# Patient Record
(demographics unavailable — no encounter records)

---

## 2024-12-31 NOTE — ASSESSMENT
[FreeTextEntry1] : 34 year old with low T to 174 and history infertility - though wife recently conceived.  Discussed natural history of infertility. Most couples achieve a pregnancy in the first 3 to 6 months of attempted conception, with 75% of couples achieving a pregnancy after 6 months of trying. After one year of attempting to conceive, approximately 85% of couples will have achieved a pregnancy. After two full years of attempting to conceive, this statistic is increased to over 90% of couples. In women under 35 years of age, infertility is considered present after 12 months of attempting to conceive. This duration is shortened in women over the age of 35 years to 6 months. Infertility may be due to male factors, female factors or a combination of male and female factors. a workup of both partners is always required. Discussed that maternal age is the strongest predictor of fertility outcome in couples undergoing therapy.  We discussed role of semen analysis - given wife recently pregnant he would like to hold at this time.  Discussed low T. Discussed AUA guidelines and 2 AM checks <300. Reports was also told low T in past but does not know value. Will recheck today along with LH, FSH, estradiol, prolactin. Discussed T supplements and impact on fertility and potential role of Clomid. Will refer patient to see Dr. Lara - sent message for his team to reach out.  Plan: - T, FSH, LH, estradiol, prolactin - will hold on  at this time given wife pregnant - f/u with Dr. Lara to discuss results and further management

## 2024-12-31 NOTE — PHYSICAL EXAM
[Urethral Meatus] : meatus normal [Penis Abnormality] : normal circumcised penis [de-identified] : testicles difficult to palpate given habitus [Chaperone Present] : A chaperone was present in the examining room during all aspects of the physical examination [FreeTextEntry2] : Martha

## 2024-12-31 NOTE — LETTER BODY
[Dear  ___] : Dear  [unfilled], [Courtesy Letter:] : I had the pleasure of seeing your patient, [unfilled], in my office today. [Please see my note below.] : Please see my note below. [Consult Closing:] : Thank you very much for allowing me to participate in the care of this patient.  If you have any questions, please do not hesitate to contact me. [Sincerely,] : Sincerely, [FreeTextEntry3] : Rona Vasquez MD Director of Robotic Education [facility]   [email or web address] [phone]

## 2025-03-03 NOTE — HISTORY OF PRESENT ILLNESS
[FreeTextEntry1] : REHAN PALOMARES  is 34 year referred by Dr Rona Vasquez Job:   Partner Name: Galina Waldron Partner Age: 35 Prior marriage: none  Prior Pregnancy miscarriage at 2.5 months   Duration of Relationship: 6 years Years unprotected sexual intercourse: 1 year Time [place]: x 8 months  Sexual dysfunction: none  Artificial lubricants:none Exposure history: no tobacco; no ETOH

## 2025-03-03 NOTE — LETTER BODY
[Please see my note below.] : Please see my note below. [FreeTextEntry2] : Devicka Persaud, MD [FreeTextEntry1] :   Dear Doctor,     I had the opportunity to see your patient, Mr. REHAN PALOMARES today. I am enclosing my office note for your information.   I will keep you informed of any developments.   Feel free to contact me if you have any questions.   Sincerely,   Junior Lara MD, FACS Professor of Urology [facility] [place] [address]   Office Telephone [phone]   Fax [phone] [DrDusty  ___] : Dr. COLBY

## 2025-03-03 NOTE — ASSESSMENT
[FreeTextEntry1] : 34 year old  who presents wtih inferility He and wife recently had miscarriage at 2 months Patient has had  2 Testosterone levels which were low ( 174) Had been put on T at 21 years of age ? indications  ? related to sparse hair loss  Examination demonstrates large testicles although high in scrotum  We reviewed the findings on physical examination and the endocrine studies.  He has no signs of hypogonadism.  We discussed the indications for treatment at low testosterone.  We discussed proceeding with a bone density. We discussed  assessing T and free T levels  We discussed proceeding with testicular ultrasound in light of difficulty assessing testes for masses etc  Plan: semen analysis  bone density T/free Testosterone SHBG Bioavailable Testosterone +/- clomid pending above The REHAN MARIELLE  expressed fully understanding of the information provided, the consequences and the management.

## 2025-03-03 NOTE — PHYSICAL EXAM
[General Appearance - Well Developed] : well developed [Abdomen Soft] : soft [Abdomen Tenderness] : non-tender [] : no hepato-splenomegaly [Abdomen Mass (___ Cm)] : no abdominal mass palpated [Abdomen Hernia] : no hernia was discovered [Urethral Meatus] : meatus normal [Penis Abnormality] : normal circumcised penis [FreeTextEntry1] : normal hair pattern; nomal male escutcheon [de-identified] : high within scrotum; testicles large; no obvious mass

## 2025-04-07 NOTE — ASSESSMENT
[FreeTextEntry1] : 34 year old  who presents wtih inferility He and wife recently had miscarriage at 2 months Patient has had  2 Testosterone levels which were low ( 174) Had been put on T at 21 years of age ? indications  ? related to sparse hair loss  Examination demonstrates large testicles although high in scrotum  We reviewed the findings on physical examination and the endocrine studies.  He has no signs of hypogonadism.  We discussed the indications for treatment at low testosterone.  We discussed proceeding with a bone density. We discussed  assessing T and free T levels  We discussed proceeding with testicular ultrasound in light of difficulty assessing testes for masses etc  Plan: semen analysis  bone density T/free Testosterone SHBG Bioavailable Testosterone +/- clomid pending above The REHAN PALOMARES  expressed fully understanding of the information provided, the consequences and the management.  4.7.2025 returns for USG and management ultrasound no masses no varicocele left 20 cc right 22 cc  Ultrasound did not demonstrate any masses varicoceles etc.  The testicles are large as was consistent with his physical examination.  Repeat testosterone levels are not low.  His free testosterone is low.  His bioavailable testosterone is within the normal range.  Bone density was normal.  Patient states that he had a semen analysis done.  He does not recall where this was done.  The results are not available to me.  semen analysis  3/12/2025 volume 2 [92] tc 184 motility 55% 1 % normal morphology discussed lack of relationship with miscarriage antioxidant [place] lack of clincal efficacy  discussed indication for treatment inability to build muscle ? decreased T  I discussed Clomid physiologic basis withREAHN PALOMARES  We discussed the utilization of Clomid in the treatment of his primary complaint We discussed potential side effects of Clomid  in relation to physiologic effects and adverse effects. We specifically reviewed  acne, hair loss, gynecomastia, visual disturbance, signs and symptoms of "puberty" We discussed that utilization and the fact the this is not an FDA approved utilization. The dose regimen was reviewed. 1/2 tablet qd We discussed the need to monitor the physiologic hormonal response. We discussed repeat labs and followup thereafter.  Discussed need for assessment of LFTs prior to clomid therapy The REHAN PALOMARES  expressed fully understanding of the information provided, the consequences and the management.   Plan: CMP today  clomid 1/2 tab daily if LFTs ok labs in 3 weeks  fu in 4 weeks

## 2025-04-07 NOTE — HISTORY OF PRESENT ILLNESS
[FreeTextEntry1] : REHAN PALOMARES  is 34 year referred by Dr Rona Vasquez Job:   Partner Name: Galina Waldron Partner Age: 35 Prior marriage: none  Prior Pregnancy miscarriage at 2.5 months   Duration of Relationship: 6 years Years unprotected sexual intercourse: 1 year Time [place]: x 8 months  Sexual dysfunction: none  Artificial lubricants:none Exposure history: no tobacco; no ETOH

## 2025-05-06 NOTE — HISTORY OF PRESENT ILLNESS
[Other Location: e.g. School (Enter Location, City,State)___] : at [unfilled], at the time of the visit. [Medical Office: ([facility])___] : at the medical office located in  [Telehealth (audio & video)] : This visit was provided via telehealth using real-time 2-way audio visual technology. [Verbal consent obtained from patient] : the patient, [unfilled] [FreeTextEntry1] : REHAN PALOMARES  is 34 year referred by Dr Rona Vasquez Job:   Partner Name: Galina Waldron Partner Age: 35 Prior marriage: none  Prior Pregnancy miscarriage at 2.5 months   Duration of Relationship: 6 years Years unprotected sexual intercourse: 1 year Time [place]: x 8 months  Sexual dysfunction: none  Artificial lubricants:none Exposure history: no tobacco; no ETOH

## 2025-05-06 NOTE — ASSESSMENT
[FreeTextEntry1] : 34 year old  who presents wtih inferility He and wife recently had miscarriage at 2 months Patient has had  2 Testosterone levels which were low ( 174) Had been put on T at 21 years of age ? indications  ? related to sparse hair loss  Examination demonstrates large testicles although high in scrotum  We reviewed the findings on physical examination and the endocrine studies.  He has no signs of hypogonadism.  We discussed the indications for treatment at low testosterone.  We discussed proceeding with a bone density. We discussed  assessing T and free T levels  We discussed proceeding with testicular ultrasound in light of difficulty assessing testes for masses etc  Plan: semen analysis  bone density T/free Testosterone SHBG Bioavailable Testosterone +/- clomid pending above The REHAN MARIELLE  expressed fully understanding of the information provided, the consequences and the management.  4.7.2025 returns for USG and management ultrasound no masses no varicocele left 20 cc right 22 cc  Ultrasound did not demonstrate any masses varicoceles etc.  The testicles are large as was consistent with his physical examination.  Repeat testosterone levels are not low.  His free testosterone is low.  His bioavailable testosterone is within the normal range.  Bone density was normal.  Patient states that he had a semen analysis done.  He does not recall where this was done.  The results are not available to me.  semen analysis  3/12/2025 volume 2 [92] tc 184 motility 55% 1 % normal morphology discussed lack of relationship with miscarriage antioxidant [place] lack of clincal efficacy  discussed indication for treatment inability to build muscle ? decreased T  I discussed Clomid physiologic basis withREHAN PALOMARES  We discussed the utilization of Clomid in the treatment of his primary complaint We discussed potential side effects of Clomid  in relation to physiologic effects and adverse effects. We specifically reviewed  acne, hair loss, gynecomastia, visual disturbance, signs and symptoms of "puberty" We discussed that utilization and the fact the this is not an FDA approved utilization. The dose regimen was reviewed. 1/2 tablet qd We discussed the need to monitor the physiologic hormonal response. We discussed repeat labs and followup thereafter.  Discussed need for assessment of LFTs prior to clomid therapy The REHAN PALOMARES  expressed fully understanding of the information provided, the consequences and the management.   Plan: CMP today  clomid 1/2 tab daily if LFTs ok labs in 3 weeks  fu in 4 weeks  5.6.2025 returns on clomid "I tjhink it helped with "some stuff" improved focus normal sexual function  Labs T 578 LH 6.3 FSH 8.1 Estradiol 58 (<39) discussed results and significance discussed the utilization of anastrozole  explained physiology  Plan: 1. clomid 25 mg daily 2. anastrazole 1 mg daily 3. labs in 3 weeks ([facility]) 4. fu in 4 weeks

## 2025-06-03 NOTE — ASSESSMENT
[FreeTextEntry1] : 34 year old  who presents wtih inferility He and wife recently had miscarriage at 2 months Patient has had  2 Testosterone levels which were low ( 174) Had been put on T at 21 years of age ? indications  ? related to sparse hair loss  Examination demonstrates large testicles although high in scrotum  We reviewed the findings on physical examination and the endocrine studies.  He has no signs of hypogonadism.  We discussed the indications for treatment at low testosterone.  We discussed proceeding with a bone density. We discussed  assessing T and free T levels  We discussed proceeding with testicular ultrasound in light of difficulty assessing testes for masses etc  Plan: semen analysis  bone density T/free Testosterone SHBG Bioavailable Testosterone +/- clomid pending above The REHAN MARIELLE  expressed fully understanding of the information provided, the consequences and the management.  4.7.2025 returns for USG and management ultrasound no masses no varicocele left 20 cc right 22 cc  Ultrasound did not demonstrate any masses varicoceles etc.  The testicles are large as was consistent with his physical examination.  Repeat testosterone levels are not low.  His free testosterone is low.  His bioavailable testosterone is within the normal range.  Bone density was normal.  Patient states that he had a semen analysis done.  He does not recall where this was done.  The results are not available to me.  semen analysis  3/12/2025 volume 2 [92] tc 184 motility 55% 1 % normal morphology discussed lack of relationship with miscarriage antioxidant [place] lack of clincal efficacy  discussed indication for treatment inability to build muscle ? decreased T  I discussed Clomid physiologic basis withREHAN PALOMARES  We discussed the utilization of Clomid in the treatment of his primary complaint We discussed potential side effects of Clomid  in relation to physiologic effects and adverse effects. We specifically reviewed  acne, hair loss, gynecomastia, visual disturbance, signs and symptoms of "puberty" We discussed that utilization and the fact the this is not an FDA approved utilization. The dose regimen was reviewed. 1/2 tablet qd We discussed the need to monitor the physiologic hormonal response. We discussed repeat labs and followup thereafter.  Discussed need for assessment of LFTs prior to clomid therapy The REHAN PALOMARES  expressed fully understanding of the information provided, the consequences and the management.   Plan: CMP today  clomid 1/2 tab daily if LFTs ok labs in 3 weeks  fu in 4 weeks   6.3.2025 returns on clomid 25 mg qd returns on anastrazole q d  Estradiol 61 T 953 Free T 256 LFTS normal  no clear change in muscle mass exercise tolerance sexual function normal  recommend reduce to clomid 25 qod continue anastrozole 1 mg labs in 3 weeks chc  at that time

## 2025-06-03 NOTE — HISTORY OF PRESENT ILLNESS
[Other Location: e.g. School (Enter Location, City,State)___] : at [unfilled], at the time of the visit. [Medical Office: ([place] [facility])___] : at the medical office located in  [This encounter was initiated by telehealth (audio with video) and converted to telephone (audio only)] : This encounter was initiated by telehealth (audio with video) and converted to telephone (audio only) [Verbal consent obtained from patient] : the patient, [unfilled] [FreeTextEntry1] : REHAN PALOMARES  is 34 year referred by Dr Rona Vasquez Job:   Partner Name: Galina Waldron Partner Age: 35 Prior marriage: none  Prior Pregnancy miscarriage at 2.5 months   Duration of Relationship: 6 years Years unprotected sexual intercourse: 1 year Time [place]: x 8 months  Sexual dysfunction: none  Artificial lubricants:none Exposure history: no tobacco; no ETOH

## 2025-07-01 NOTE — HISTORY OF PRESENT ILLNESS
[Other Location: e.g. School (Enter Location, City,State)___] : at [unfilled], at the time of the visit. [Medical Office: ([facility])___] : at the medical office located in  [Telehealth (audio & video)] : This visit was provided via telehealth using real-time 2-way audio visual technology. [Verbal consent obtained from patient] : the patient, [unfilled] [FreeTextEntry1] : REHAN PALOMARES  is 34 year referred by Dr Rona Vasquez Job:   Partner Name: Galina Waldron Partner Age: 35 Prior marriage: none  Prior Pregnancy miscarriage at 2.5 months   Duration of Relationship: 6 years Years unprotected sexual intercourse: 1 year Time [place]: x 8 months  Sexual dysfunction: none  Artificial lubricants:none Exposure history: no tobacco; no ETOH   7.1.2025 returns by telemedicine on  clomid 25 mg qod anastrazole 1 mg qd

## 2025-07-01 NOTE — ASSESSMENT
[FreeTextEntry1] : 34 year old  who presents [facility] infertility He and wife recently had miscarriage at 2 months Patient has had  2 Testosterone levels which were low ( 174) Had been put on T at 21 years of age ? indications  ? related to sparse hair loss  Examination demonstrates large testicles although high in scrotum  We reviewed the findings on physical examination and the endocrine studies.  He has no signs of hypogonadism.  We discussed the indications for treatment at low testosterone.  We discussed proceeding with a bone density. We discussed  assessing T and free T levels  We discussed proceeding with testicular ultrasound in light of difficulty assessing testes for masses etc  Plan: semen analysis  bone density T/free Testosterone SHBG Bioavailable Testosterone +/- clomid pending above The REHAN MARIELLE  expressed fully understanding of the information provided, the consequences and the management.  4.7.2025 returns for USG and management ultrasound no masses no varicocele left 20 cc right 22 cc  Ultrasound did not demonstrate any masses varicoceles etc.  The testicles are large as was consistent with his physical examination.  Repeat testosterone levels are not low.  His free testosterone is low.  His bioavailable testosterone is within the normal range.  Bone density was normal.  Patient states that he had a semen analysis done.  He does not recall where this was done.  The results are not available to me.  semen analysis  3/12/2025 volume 2 [92] tc 184 motility 55% 1 % normal morphology discussed lack of relationship with miscarriage antioxidant [place] lack of clincal efficacy  discussed indication for treatment inability to build muscle ? decreased T  I discussed Clomid physiologic basis with REHAN PALOMARES  We discussed the utilization of Clomid in the treatment of his primary complaint We discussed potential side effects of Clomid  in relation to physiologic effects and adverse effects. We specifically reviewed  acne, hair loss, gynecomastia, visual disturbance, signs and symptoms of "puberty" We discussed that utilization and the fact the this is not an FDA approved utilization. The dose regimen was reviewed. 1/2 tablet qd We discussed the need to monitor the physiologic hormonal response. We discussed repeat labs and followup thereafter.  Discussed need for assessment of LFTs prior to clomid therapy The REHAN PALOMARES  expressed fully understanding of the information provided, the consequences and the management.   Plan: CMP today  clomid 1/2 tab daily if LFTs ok labs in 3 weeks  fu in 4 weeks  7.1.2025 returns on clomid no adverse affects libido improved and energy improved no breast tenderness  no acne   Free T 217.6 () Estradiol 53 (<39) Hbg 15.4 HCt 49.2   on clomid 25 mg qod on anastrazole 1 mg daily  discussed abnormal response to clomid and anastrazole discussed estradiol remains elevated  options depend on goal of treatment will reduce clomid to 25 mg BIW will contineu anastrazole labs and inperson fu in 3 weeks  Plan clomid 25 mg BIW anastrozole 1 mg daily T; Estradiol; H/H and CMP in 3 weeks fu in 4 weeks (in person) The REHAN PALOMARES  expressed fully understanding of the information provided, the consequences and the management.

## 2025-07-01 NOTE — HISTORY OF PRESENT ILLNESS
[Other Location: e.g. School (Enter Location, City,State)___] : at [unfilled], at the time of the visit. [Medical Office: ([facility])___] : at the medical office located in  [Telehealth (audio & video)] : This visit was provided via telehealth using real-time 2-way audio visual technology. [Verbal consent obtained from patient] : the patient, [unfilled] [FreeTextEntry1] : REHAN PALOMARES  is 34 year referred by Dr Rona Vasquez Job:   Partner Name: Galina Waldron Partner Age: 35 Prior marriage: none  Prior Pregnancy miscarriage at 2.5 months   Duration of Relationship: 6 years Years unprotected sexual intercourse: 1 year Time [place]: x 8 months  Sexual dysfunction: none  Artificial lubricants:none Exposure history: no tobacco; no ETOH   7.1.2025 returns by telemedicine on  clomid 25 mg qod anastrazole 1 mg qd

## 2025-07-01 NOTE — ASSESSMENT
[FreeTextEntry1] : 34 year old  who presents [facility] infertility He and wife recently had miscarriage at 2 months Patient has had  2 Testosterone levels which were low ( 174) Had been put on T at 21 years of age ? indications  ? related to sparse hair loss  Examination demonstrates large testicles although high in scrotum  We reviewed the findings on physical examination and the endocrine studies.  He has no signs of hypogonadism.  We discussed the indications for treatment at low testosterone.  We discussed proceeding with a bone density. We discussed  assessing T and free T levels  We discussed proceeding with testicular ultrasound in light of difficulty assessing testes for masses etc  Plan: semen analysis  bone density T/free Testosterone SHBG Bioavailable Testosterone +/- clomid pending above The REHAN MARIELLE  expressed fully understanding of the information provided, the consequences and the management.  4.7.2025 returns for USG and management ultrasound no masses no varicocele left 20 cc right 22 cc  Ultrasound did not demonstrate any masses varicoceles etc.  The testicles are large as was consistent with his physical examination.  Repeat testosterone levels are not low.  His free testosterone is low.  His bioavailable testosterone is within the normal range.  Bone density was normal.  Patient states that he had a semen analysis done.  He does not recall where this was done.  The results are not available to me.  semen analysis  3/12/2025 volume 2 [92] tc 184 motility 55% 1 % normal morphology discussed lack of relationship with miscarriage antioxidant [place] lack of clincal efficacy  discussed indication for treatment inability to build muscle ? decreased T  I discussed Clomid physiologic basis with REHAN PALOMARES  We discussed the utilization of Clomid in the treatment of his primary complaint We discussed potential side effects of Clomid  in relation to physiologic effects and adverse effects. We specifically reviewed  acne, hair loss, gynecomastia, visual disturbance, signs and symptoms of "puberty" We discussed that utilization and the fact the this is not an FDA approved utilization. The dose regimen was reviewed. 1/2 tablet qd We discussed the need to monitor the physiologic hormonal response. We discussed repeat labs and followup thereafter.  Discussed need for assessment of LFTs prior to clomid therapy The REHAN PALOMARES  expressed fully understanding of the information provided, the consequences and the management.   Plan: CMP today  clomid 1/2 tab daily if LFTs ok labs in 3 weeks  fu in 4 weeks  7.1.2025 returns on clomid no adverse affects libido improved and energy improved no breast tenderness  no acne   Free T 217.6 () Estradiol 53 (<39) Hbg 15.4 HCt 49.2   on clomid 25 mg qod on anastrazole 1 mg daily  discussed abnormal response to clomid and anastrazole discussed estradiol remains elevated  options depend on goal of treatment will reduce clomid to 25 mg BIW will contineu anastrazole labs and inperson fu in 3 weeks  Plan clomid 25 mg BIW anastrozole 1 mg daily T; Estradiol; H/H and CMP in 3 weeks fu in 4 weeks (in person) The REHAN PALOMARES  expressed fully understanding of the information provided, the consequences and the management.